# Patient Record
Sex: MALE | Race: BLACK OR AFRICAN AMERICAN | NOT HISPANIC OR LATINO | Employment: FULL TIME | URBAN - METROPOLITAN AREA
[De-identification: names, ages, dates, MRNs, and addresses within clinical notes are randomized per-mention and may not be internally consistent; named-entity substitution may affect disease eponyms.]

---

## 2017-06-30 ENCOUNTER — HOSPITAL ENCOUNTER (EMERGENCY)
Facility: OTHER | Age: 37
Discharge: HOME | End: 2017-06-30
Attending: EMERGENCY MEDICINE
Payer: OTHER GOVERNMENT

## 2017-06-30 VITALS
OXYGEN SATURATION: 100 % | RESPIRATION RATE: 18 BRPM | TEMPERATURE: 98 F | HEIGHT: 69 IN | DIASTOLIC BLOOD PRESSURE: 77 MMHG | SYSTOLIC BLOOD PRESSURE: 135 MMHG | BODY MASS INDEX: 30.96 KG/M2 | WEIGHT: 209 LBS | HEART RATE: 60 BPM

## 2017-06-30 DIAGNOSIS — M54.12 CERVICAL RADICULOPATHY: Primary | ICD-10-CM

## 2017-06-30 PROCEDURE — 96372 THER/PROPH/DIAG INJ SC/IM: CPT

## 2017-06-30 PROCEDURE — 63600175 PHARM REV CODE 636 W HCPCS: Performed by: EMERGENCY MEDICINE

## 2017-06-30 PROCEDURE — 99283 EMERGENCY DEPT VISIT LOW MDM: CPT | Mod: 25

## 2017-06-30 PROCEDURE — 25000003 PHARM REV CODE 250: Performed by: EMERGENCY MEDICINE

## 2017-06-30 RX ORDER — KETOROLAC TROMETHAMINE 10 MG/1
10 TABLET, FILM COATED ORAL EVERY 6 HOURS
Qty: 20 TABLET | Refills: 0 | Status: SHIPPED | OUTPATIENT
Start: 2017-06-30

## 2017-06-30 RX ORDER — KETOROLAC TROMETHAMINE 30 MG/ML
60 INJECTION, SOLUTION INTRAMUSCULAR; INTRAVENOUS
Status: COMPLETED | OUTPATIENT
Start: 2017-06-30 | End: 2017-06-30

## 2017-06-30 RX ORDER — NAPROXEN SODIUM 220 MG
220 TABLET ORAL
COMMUNITY
End: 2017-06-30 | Stop reason: ALTCHOICE

## 2017-06-30 RX ORDER — KETOROLAC TROMETHAMINE 10 MG/1
10 TABLET, FILM COATED ORAL
Status: DISCONTINUED | OUTPATIENT
Start: 2017-06-30 | End: 2017-06-30

## 2017-06-30 RX ORDER — OXYCODONE AND ACETAMINOPHEN 5; 325 MG/1; MG/1
2 TABLET ORAL
Status: COMPLETED | OUTPATIENT
Start: 2017-06-30 | End: 2017-06-30

## 2017-06-30 RX ORDER — DEXAMETHASONE SODIUM PHOSPHATE 4 MG/ML
4 INJECTION, SOLUTION INTRA-ARTICULAR; INTRALESIONAL; INTRAMUSCULAR; INTRAVENOUS; SOFT TISSUE
Status: COMPLETED | OUTPATIENT
Start: 2017-06-30 | End: 2017-06-30

## 2017-06-30 RX ORDER — HYDROCODONE BITARTRATE AND ACETAMINOPHEN 5; 325 MG/1; MG/1
1 TABLET ORAL EVERY 4 HOURS PRN
Qty: 10 TABLET | Refills: 0 | Status: SHIPPED | OUTPATIENT
Start: 2017-06-30 | End: 2017-07-10

## 2017-06-30 RX ADMIN — DEXAMETHASONE SODIUM PHOSPHATE 4 MG: 4 INJECTION, SOLUTION INTRAMUSCULAR; INTRAVENOUS at 10:06

## 2017-06-30 RX ADMIN — OXYCODONE AND ACETAMINOPHEN 2 TABLET: 5; 325 TABLET ORAL at 10:06

## 2017-06-30 RX ADMIN — KETOROLAC TROMETHAMINE 60 MG: 30 INJECTION, SOLUTION INTRAMUSCULAR at 10:06

## 2017-06-30 NOTE — ED PROVIDER NOTES
Encounter Date: 6/30/2017       History     Chief Complaint   Patient presents with    Neck Pain     PATIENT REPORTS NECK PAIN RADIATING TO RIGHT SHOULDER AND RIGHT ARM. PAIN BEGAN A COUPLE A WEEKS AGO AND HAS BECOME PROGRESSIVELY WORSE.     Shoulder Pain     Chief complaint: neck and shoulder pain   36 -year-old who complains of shooting pain from his neck that radiates to his right hand.  Patient symptoms have been ongoing since he had surgery several years ago but have worsened in the last 2 weeks.  Patient denies trauma.  The pain has been intermittent.  He cannot elicit any aggravating factors.  Aleve helps somewhat.  He denies weakness to the hand.  He has occasional numbness to his fingertips.  Patient said the symptoms are similar to the ones that he had prior to his previous neck surgery.  Patient rates his pain as 9 out of 10.  No chest pain or shortness of breath.  Currently patient is in between moving from [place] to  [place].          Review of patient's allergies indicates:  No Known Allergies  Past Medical History:   Diagnosis Date    Chronic back pain      Past Surgical History:   Procedure Laterality Date    BACK SURGERY       History reviewed. No pertinent family history.  Social History   Substance Use Topics    Smoking status: Former Smoker     Packs/day: 1.00     Years: 5.00     Types: Cigarettes     Quit date: 1/1/2017    Smokeless tobacco: Never Used    Alcohol use 1.2 oz/week     2 Cans of beer per week      Comment: COUPLE TIMES A WEEK     Review of Systems   Constitutional: Negative for fever.   Respiratory: Negative for shortness of breath.    Cardiovascular: Negative for chest pain.   Musculoskeletal: Positive for neck pain.   Neurological: Positive for numbness. Negative for weakness.       Physical Exam     Initial Vitals [06/30/17 1006]   BP Pulse Resp Temp SpO2   135/77 60 18 97.7 °F (36.5 °C) 100 %      MAP       96.33         Physical Exam    Nursing note and vitals  reviewed.  Constitutional: No distress.   Neck: Full passive range of motion without pain. Neck supple. No spinous process tenderness present. No neck rigidity.       Cardiovascular: Normal rate.   Pulses:       Radial pulses are 3+ on the right side.   Pulmonary/Chest: Breath sounds normal.   Musculoskeletal:        Right shoulder: He exhibits normal range of motion, no tenderness, no bony tenderness, no swelling, no effusion, no deformity, normal pulse and normal strength.   Neurological: He is alert and oriented to person, place, and time. He has normal strength.   Skin: Skin is warm and dry.         ED Course   Procedures  Labs Reviewed - No data to display          Medical Decision Making:   Initial Assessment:   36-year-old who presents with acute exacerbation of chronic neck pain.  Patient symptoms have been ongoing but worsened recently.  On exam he has no neurological deficit  ED Management:  Patient will be treated with Decadron and Toradol.  He will be prescribed Toradol and 10 hydrocodone.  Patient does need an MRI of his neck.  However he is moving to [place] in a few days.  Therefore do not think it is feasible to do an MRI here in [place] since she does not have a primary care physician.                   ED Course     Clinical Impression:   The encounter diagnosis was Cervical radiculopathy.                           Mei Malloy MD  06/30/17 [id number]

## 2024-09-15 ENCOUNTER — HOSPITAL ENCOUNTER (EMERGENCY)
Facility: HOSPITAL | Age: 44
Discharge: HOME | End: 2024-09-15
Attending: STUDENT IN AN ORGANIZED HEALTH CARE EDUCATION/TRAINING PROGRAM
Payer: OTHER GOVERNMENT

## 2024-09-15 VITALS
TEMPERATURE: 99 F | DIASTOLIC BLOOD PRESSURE: 87 MMHG | OXYGEN SATURATION: 99 % | RESPIRATION RATE: 16 BRPM | WEIGHT: 215 LBS | BODY MASS INDEX: 31.84 KG/M2 | HEIGHT: 69 IN | HEART RATE: 78 BPM | SYSTOLIC BLOOD PRESSURE: 147 MMHG

## 2024-09-15 DIAGNOSIS — N43.3 HYDROCELE OF TESTIS: ICD-10-CM

## 2024-09-15 DIAGNOSIS — N50.811 RIGHT TESTICULAR PAIN: Primary | ICD-10-CM

## 2024-09-15 LAB
BILIRUB UR QL STRIP: NEGATIVE
CLARITY UR: CLEAR
COLOR UR: YELLOW
GLUCOSE UR QL STRIP: NEGATIVE
HGB UR QL STRIP: NEGATIVE
KETONES UR QL STRIP: NEGATIVE
LEUKOCYTE ESTERASE UR QL STRIP: NEGATIVE
NITRITE UR QL STRIP: NEGATIVE
PH UR STRIP: 7 [PH] (ref 5–8)
PROT UR QL STRIP: NEGATIVE
SP GR UR STRIP: 1.01 (ref 1–1.03)
URN SPEC COLLECT METH UR: NORMAL
UROBILINOGEN UR STRIP-ACNC: NEGATIVE EU/DL

## 2024-09-15 PROCEDURE — 99284 EMERGENCY DEPT VISIT MOD MDM: CPT | Mod: 25

## 2024-09-15 PROCEDURE — 81003 URINALYSIS AUTO W/O SCOPE: CPT | Performed by: PHYSICIAN ASSISTANT

## 2024-09-15 NOTE — ED PROVIDER NOTES
"Encounter Date: 9/15/2024       History     Chief Complaint   Patient presents with    Testicle Pain     Reports noticed a "bump" on the right testicle yesterday. Denies any drainage or redness      43-year-old male presents with testicular pain.  Onset the last 24 hours, no trauma, fever or chills.  No dysuria, no abdominal pain or flank pain        Review of patient's allergies indicates:  No Known Allergies  Past Medical History:   Diagnosis Date    Chronic back pain      Past Surgical History:   Procedure Laterality Date    BACK SURGERY       No family history on file.  Social History     Tobacco Use    Smoking status: Former     Current packs/day: 0.00     Average packs/day: 1 pack/day for 5.0 years (5.0 ttl pk-yrs)     Types: Cigarettes     Start date: 2012     Quit date: 2017     Years since quittin.7    Smokeless tobacco: Never   Substance Use Topics    Alcohol use: Yes     Alcohol/week: 2.0 standard drinks of alcohol     Types: 2 Cans of beer per week     Comment: COUPLE TIMES A WEEK    Drug use: No     Review of Systems   All other systems reviewed and are negative.      Physical Exam     Initial Vitals [09/15/24 1604]   BP Pulse Resp Temp SpO2   (!) 145/91 81 18 98.5 °F (36.9 °C) 98 %      MAP       --         Physical Exam    Nursing note and vitals reviewed.  Constitutional: Vital signs are normal.  Non-toxic appearance. No distress.   HENT:   Head: Normocephalic.   Eyes: No scleral icterus.   Pulmonary/Chest: No stridor. No respiratory distress.   Bilateral chest rise   Abdominal: There is no guarding.   Genitourinary:    Genitourinary Comments: No scrotal erythema or significant tenderness palpation, symmetrical testicle bilaterally     Musculoskeletal:         General: No tenderness.      Cervical back: No rigidity.     Neurological: He is alert.   Skin: Skin is warm and dry. No rash noted.   Psychiatric: His speech is normal. He is not actively hallucinating.   Not anxious  or agitated "         ED Course   Procedures  Labs Reviewed   URINALYSIS, REFLEX TO URINE CULTURE       Result Value    Specimen UA Urine, Clean Catch      Color, UA Yellow      Appearance, UA Clear      pH, UA 7.0      Specific Gravity, UA 1.015      Protein, UA Negative      Glucose, UA Negative      Ketones, UA Negative      Bilirubin (UA) Negative      Occult Blood UA Negative      Nitrite, UA Negative      Urobilinogen, UA Negative      Leukocytes, UA Negative      Narrative:     Specimen Source->Urine          Imaging Results              US Scrotum And Testicles (Final result)  Result time 09/15/24 18:02:54      Final result by Chirag Bautista MD (09/15/24 18:02:54)                   Impression:      No sonographic evidence of acute scrotum.    Medium bilateral hydroceles.      Electronically signed by: Chirag Bautista MD  Date:    09/15/2024  Time:    18:02               Narrative:    EXAMINATION:  US SCROTUM AND TESTICLES    CLINICAL HISTORY:  Right testicular pain    TECHNIQUE:  Sonography of the scrotum and testes.    COMPARISON:  None.    FINDINGS:  Right Testicle:    *Size: 4.5 x 2.1 x 3.5 cm  *Appearance: Normal.  *Flow: Normal arterial and venous flow  *Epididymis: Normal.  *Hydrocele: Medium  *Varicocele: None.  Left Testicle:    *Size: 4.7 x 2.3 x 3.7 cm  *Appearance: Normal.  *Flow: Normal arterial and venous flow  *Epididymis: Normal.  *Hydrocele: Medium  *Varicocele: None.  Other findings: None.                                       Medications - No data to display  Medical Decision Making  43-year-old male with testicular pain, atraumatic, no evidence of superficial infection.  Urinalysis no evidence of UTI, do not suspect cellulitis.  No evidence of abscess.  Ultrasound with good blood flow no evidence of torsion but does have hydroceles, do not suspect pyocele.  Patient provided supportive care recommendations and neurology follow up.  Patient voiced understanding and agrees with plan    Amount and/or Complexity  of Data Reviewed  Independent Historian: spouse                                      Clinical Impression:  Final diagnoses:  [N50.811] Right testicular pain (Primary)  [N43.3] Hydrocele of testis          ED Disposition Condition    Discharge Stable          ED Prescriptions    None       Follow-up Information    None          Hasmukh Fuentes Jr.,   09/15/24 [id number]

## 2024-09-15 NOTE — DISCHARGE INSTRUCTIONS
Urinalysis pending.  Referral placed for urology follow up for hydrocele management.  Return to ED for any worsening symptoms.  Worse supportive under wearing use ibuprofen for pain control to 3 times a day.

## 2024-09-15 NOTE — FIRST PROVIDER EVALUATION
" Emergency Department TeleTriage Encounter Note      CHIEF COMPLAINT    Chief Complaint   Patient presents with    Testicle Pain     Reports noticed a "bump" on the right testicle yesterday. Denies any drainage or redness        VITAL SIGNS   Initial Vitals [09/15/24 1604]   BP Pulse Resp Temp SpO2   (!) 145/91 81 18 98.5 °F (36.9 °C) 98 %      MAP       --            ALLERGIES    Review of patient's allergies indicates:  No Known Allergies    PROVIDER TRIAGE NOTE  Patient presents with right testicular pain and "lump" since yesterday. No urinary symptoms.       ORDERS  Labs Reviewed - No data to display    ED Orders (720h ago, onward)      None              Virtual Visit Note: The provider triage portion of this emergency department evaluation and documentation was performed via [employer], a HIPAA-compliant telemedicine application, in concert with a tele-presenter in the room. A face to face patient evaluation with one of my colleagues will occur once the patient is placed in an emergency department room.      DISCLAIMER: This note was prepared with [employer] voice recognition transcription software. Garbled syntax, mangled pronouns, and other bizarre constructions may be attributed to that software system.    "

## 2024-11-27 ENCOUNTER — PATIENT MESSAGE (OUTPATIENT)
Dept: RESEARCH | Facility: HOSPITAL | Age: 44
End: 2024-11-27